# Patient Record
Sex: MALE | Race: WHITE | NOT HISPANIC OR LATINO | Employment: FULL TIME | URBAN - NONMETROPOLITAN AREA
[De-identification: names, ages, dates, MRNs, and addresses within clinical notes are randomized per-mention and may not be internally consistent; named-entity substitution may affect disease eponyms.]

---

## 2024-06-18 ENCOUNTER — HOSPITAL ENCOUNTER (EMERGENCY)
Facility: HOSPITAL | Age: 29
Discharge: HOME | End: 2024-06-19
Attending: EMERGENCY MEDICINE

## 2024-06-18 ENCOUNTER — APPOINTMENT (OUTPATIENT)
Dept: RADIOLOGY | Facility: HOSPITAL | Age: 29
End: 2024-06-18

## 2024-06-18 DIAGNOSIS — M54.31 RIGHT SIDED SCIATICA: ICD-10-CM

## 2024-06-18 DIAGNOSIS — S39.012A LUMBOSACRAL STRAIN, INITIAL ENCOUNTER: Primary | ICD-10-CM

## 2024-06-18 PROCEDURE — 99284 EMERGENCY DEPT VISIT MOD MDM: CPT

## 2024-06-18 PROCEDURE — 72110 X-RAY EXAM L-2 SPINE 4/>VWS: CPT

## 2024-06-18 PROCEDURE — 73502 X-RAY EXAM HIP UNI 2-3 VIEWS: CPT | Mod: RT

## 2024-06-18 RX ORDER — CYCLOBENZAPRINE HCL 10 MG
10 TABLET ORAL 3 TIMES DAILY
COMMUNITY
Start: 2024-06-13

## 2024-06-18 RX ORDER — METHYLPREDNISOLONE 4 MG/1
4 TABLET ORAL ONCE
COMMUNITY
Start: 2024-06-13 | End: 2024-06-19

## 2024-06-18 ASSESSMENT — COLUMBIA-SUICIDE SEVERITY RATING SCALE - C-SSRS
6. HAVE YOU EVER DONE ANYTHING, STARTED TO DO ANYTHING, OR PREPARED TO DO ANYTHING TO END YOUR LIFE?: NO
2. HAVE YOU ACTUALLY HAD ANY THOUGHTS OF KILLING YOURSELF?: NO
1. IN THE PAST MONTH, HAVE YOU WISHED YOU WERE DEAD OR WISHED YOU COULD GO TO SLEEP AND NOT WAKE UP?: NO

## 2024-06-18 NOTE — Clinical Note
Morteza Flores was seen and treated in our emergency department on 6/18/2024.  He may return to work on 06/21/2024.       If you have any questions or concerns, please don't hesitate to call.      Blas Burnett, DO

## 2024-06-19 VITALS
WEIGHT: 190 LBS | TEMPERATURE: 98 F | SYSTOLIC BLOOD PRESSURE: 135 MMHG | OXYGEN SATURATION: 99 % | BODY MASS INDEX: 27.2 KG/M2 | RESPIRATION RATE: 16 BRPM | HEIGHT: 70 IN | HEART RATE: 79 BPM | DIASTOLIC BLOOD PRESSURE: 68 MMHG

## 2024-06-19 PROCEDURE — 73502 X-RAY EXAM HIP UNI 2-3 VIEWS: CPT | Mod: RIGHT SIDE | Performed by: RADIOLOGY

## 2024-06-19 PROCEDURE — 72110 X-RAY EXAM L-2 SPINE 4/>VWS: CPT | Mod: RIGHT SIDE | Performed by: RADIOLOGY

## 2024-06-19 RX ORDER — CYCLOBENZAPRINE HCL 10 MG
10 TABLET ORAL 3 TIMES DAILY PRN
Qty: 21 TABLET | Refills: 0 | Status: SHIPPED | OUTPATIENT
Start: 2024-06-19

## 2024-06-19 RX ORDER — IBUPROFEN 600 MG/1
600 TABLET ORAL EVERY 8 HOURS PRN
Qty: 30 TABLET | Refills: 0 | Status: SHIPPED | OUTPATIENT
Start: 2024-06-19

## 2024-06-19 ASSESSMENT — PAIN - FUNCTIONAL ASSESSMENT: PAIN_FUNCTIONAL_ASSESSMENT: 0-10

## 2024-06-19 ASSESSMENT — PAIN SCALES - GENERAL: PAINLEVEL_OUTOF10: 5 - MODERATE PAIN

## 2024-06-19 NOTE — ED TRIAGE NOTES
Pt c/o lower back pain. Was seen at  on 6/13 prescribed a muscle relaxer and steroids, pt does have a history of lower back pain with hardware placement.

## 2024-06-19 NOTE — DISCHARGE INSTRUCTIONS
Use ice and/or heat as needed for pain  Take medications only as directed.  X-ray imaging is unremarkable.  Rest and avoid strenuous activity.  Recheck with your doctor and/or occupational medicine in the next 2 to 3 days.

## 2024-06-19 NOTE — ED PROVIDER NOTES
Department of Emergency Medicine   ED  Provider Note  Admit Date/RoomTime: 6/18/2024 10:50 PM  ED Room: DECON/DECON                  History of Present Illness:   Morteza Flores is a 28 y.o. male presenting to the ED for back pain and right hip pain, beginning 1 week ago.  The complaint has been intermittent, moderate in severity, and worsened by  movement, lifting, weightbearing .  Patient reports that last week he was doing some heavy lifting at work he developed some right low back pain.  He went to urgent care on Thursday last week and was prescribed a muscle relaxer and a steroid.  He says he did not work on Thursday or Friday but went back to work yesterday.  He now complains of pain in the right hip area.  He denies any history of trauma or fall.  Pain seems to be worse with movement and change in position.  He said no loss of bowel or bladder control.  No saddle paresthesia.  No numbness tingling or weakness of his extremities.  He does have some pain in the right low back that radiates across the buttock and across the hip region.  No dysuria urgency frequency hematuria hesitancy or incontinence.  No urinary retention.  No previous back surgeries but does report previous right ischial surgery in the remote past.      Review of Systems:   Pertinent positives and review of systems as noted above.  Remaining 10 review of systems is negative or noncontributory to today's episode of care.  Review of Systems   A complete review of systems is otherwise negative except as noted above.    --------------------------------------------- PAST HISTORY ---------------------------------------------  Past Medical History:  has no past medical history on file.    Past Surgical History:  has no past surgical history on file.    Social History:  No significant tobacco use.  No alcohol use.  No drug use.    Family History: family history is not on file. Unless otherwise noted, family history is non contributory    Patient's  Medications   New Prescriptions    CYCLOBENZAPRINE (FLEXERIL) 10 MG TABLET    Take 1 tablet (10 mg) by mouth 3 times a day as needed for muscle spasms (Pain).    IBUPROFEN 600 MG TABLET    Take 1 tablet (600 mg) by mouth every 8 hours if needed for mild pain (1 - 3) or fever (temp greater than 38.0 C).   Previous Medications    CYCLOBENZAPRINE (FLEXERIL) 10 MG TABLET    Take 1 tablet (10 mg) by mouth 3 times a day.    METHYLPREDNISOLONE (MEDROL DOSPAK) 4 MG TABLETS    Take 1 tablet (4 mg) by mouth 1 time.   Modified Medications    No medications on file   Discontinued Medications    No medications on file      The patient’s home medications have been reviewed.    Allergies: Patient has no known allergies.    -------------------------------------------------- RESULTS -------------------------------------------------  All laboratory and radiology results have been personally reviewed by myself   LABS:  Labs Reviewed - No data to display      RADIOLOGY:  Interpreted by Radiologist.  XR hip right with pelvis when performed 2 or 3 views   Final Result   No evidence of acute displaced pelvic or right hip fracture.        Postsurgical change with orthopedic screws in right ischium.        No acute fracture or malalignment of the lumbar spine             MACRO:   None        Signed by: Ryan Garza 6/19/2024 12:57 AM   Dictation workstation:   [id number]      XR lumbar spine complete 4+ views   Final Result   No evidence of acute displaced pelvic or right hip fracture.        Postsurgical change with orthopedic screws in right ischium.        No acute fracture or malalignment of the lumbar spine             MACRO:   None        Signed by: Ryan Garza 6/19/2024 12:57 AM   Dictation workstation:   [id number]          No results found for this or any previous visit (from the past 4464 hour(s)).  ------------------------- NURSING NOTES AND VITALS REVIEWED ---------------------------   The nursing notes within the ED  "encounter and vital signs as below have been reviewed.   /88 (BP Location: Left arm)   Pulse 85   Temp 36.7 °C (98 °F) (Oral)   Resp 14   Ht 1.778 m (5' 10\")   Wt 86.2 kg (190 lb)   SpO2 99%   BMI 27.26 kg/m²   Oxygen Saturation Interpretation: Normal      ---------------------------------------------------PHYSICAL EXAM--------------------------------------  Physical Exam  Vitals and nursing note reviewed.   Constitutional:       General: He is not in acute distress.     Appearance: Normal appearance. He is well-developed. He is not ill-appearing or toxic-appearing.   HENT:      Head: Normocephalic and atraumatic.      Mouth/Throat:      Mouth: Mucous membranes are moist.      Pharynx: Oropharynx is clear.   Eyes:      General: No scleral icterus.     Conjunctiva/sclera: Conjunctivae normal.   Cardiovascular:      Rate and Rhythm: Normal rate and regular rhythm.      Pulses: Normal pulses.      Heart sounds: Normal heart sounds. No murmur heard.  Pulmonary:      Effort: Pulmonary effort is normal. No respiratory distress.      Breath sounds: Normal breath sounds. No wheezing or rhonchi.   Abdominal:      Palpations: Abdomen is soft.      Tenderness: There is no abdominal tenderness. There is no right CVA tenderness, left CVA tenderness, guarding or rebound.   Musculoskeletal:         General: No swelling, tenderness, deformity or signs of injury. Normal range of motion.      Cervical back: Neck supple. No rigidity or tenderness.      Right lower leg: No edema.      Left lower leg: No edema.      Comments: Straight leg raising is intact bilaterally.  Plantarflexion is 5 out of 5, EHL is 5 out of 5.  There is no ankle clonus.  No lower extremity weakness.  There is some tenderness in the right paraspinal lumbar area and the right SI joint region.  No midline bony tenderness or step-off.  Bilateral lower extremities neurovascularly intact.   Lymphadenopathy:      Cervical: No cervical adenopathy.   Skin:   "   General: Skin is warm and dry.      Capillary Refill: Capillary refill takes less than 2 seconds.      Findings: No rash.   Neurological:      Mental Status: He is alert and oriented to person, place, and time.   Psychiatric:         Mood and Affect: Mood normal.            Procedures  None  ------------------------------ ED COURSE/MEDICAL DECISION MAKING----------------------    Medical Decision Making:   Patient was seen and examined by me.  Patient declined any pain medication at this time.  X-rays of the lumbar spine and the right hip and pelvis have been ordered.    Imaging is unremarkable.  Patient is prescribed Flexeril and ibuprofen for pain as needed.  Patient is given a work excuse for the next 2 days.  Patient is asked to follow-up with primary care and/or orthopedics.  Patient was given referral to occupational medicine.    ED Course as of 06/19/24 0114   Wed Jun 19, 2024   0101 X-ray of the lumbar spine, pelvis and right hip  IMPRESSION:  No evidence of acute displaced pelvic or right hip fracture.      Postsurgical change with orthopedic screws in right ischium.      No acute fracture or malalignment of the lumbar spine   [EC]      ED Course User Index  [EC] Blas Burnett DO         Diagnoses as of 06/19/24 0114   Lumbosacral strain, initial encounter   Right sided sciatica      Counseling:   The emergency provider has spoken with the patient and discussed today’s results, in addition to providing specific details for the plan of care and counseling regarding the diagnosis and prognosis.  Questions are answered at this time and they are agreeable with the plan.      --------------------------------- IMPRESSION AND DISPOSITION ---------------------------------        IMPRESSION  1. Lumbosacral strain, initial encounter    2. Right sided sciatica        DISPOSITION  Disposition: Discharge to home  Patient condition is fair      Billing Provider Critical Care Time: 0 minutes     Blas Burnett  DO  06/19/24 0113       Blas Burnett, DO  06/19/24 0114